# Patient Record
Sex: MALE | Race: WHITE
[De-identification: names, ages, dates, MRNs, and addresses within clinical notes are randomized per-mention and may not be internally consistent; named-entity substitution may affect disease eponyms.]

---

## 2020-08-31 ENCOUNTER — HOSPITAL ENCOUNTER (INPATIENT)
Dept: HOSPITAL 62 - ER | Age: 85
LOS: 2 days | Discharge: HOME | DRG: 149 | End: 2020-09-02
Attending: INTERNAL MEDICINE | Admitting: INTERNAL MEDICINE
Payer: MEDICARE

## 2020-08-31 DIAGNOSIS — F32.9: ICD-10-CM

## 2020-08-31 DIAGNOSIS — Z79.4: ICD-10-CM

## 2020-08-31 DIAGNOSIS — E11.9: ICD-10-CM

## 2020-08-31 DIAGNOSIS — I25.10: ICD-10-CM

## 2020-08-31 DIAGNOSIS — K21.9: ICD-10-CM

## 2020-08-31 DIAGNOSIS — J32.0: ICD-10-CM

## 2020-08-31 DIAGNOSIS — R29.6: ICD-10-CM

## 2020-08-31 DIAGNOSIS — Z79.51: ICD-10-CM

## 2020-08-31 DIAGNOSIS — Z87.891: ICD-10-CM

## 2020-08-31 DIAGNOSIS — Z88.0: ICD-10-CM

## 2020-08-31 DIAGNOSIS — Z79.52: ICD-10-CM

## 2020-08-31 DIAGNOSIS — R22.1: ICD-10-CM

## 2020-08-31 DIAGNOSIS — Z79.890: ICD-10-CM

## 2020-08-31 DIAGNOSIS — Z86.73: ICD-10-CM

## 2020-08-31 DIAGNOSIS — I25.2: ICD-10-CM

## 2020-08-31 DIAGNOSIS — H81.399: Primary | ICD-10-CM

## 2020-08-31 DIAGNOSIS — Z79.82: ICD-10-CM

## 2020-08-31 DIAGNOSIS — E78.5: ICD-10-CM

## 2020-08-31 DIAGNOSIS — H81.8X9: ICD-10-CM

## 2020-08-31 DIAGNOSIS — R41.0: ICD-10-CM

## 2020-08-31 DIAGNOSIS — J44.9: ICD-10-CM

## 2020-08-31 DIAGNOSIS — R41.81: ICD-10-CM

## 2020-08-31 DIAGNOSIS — Z91.81: ICD-10-CM

## 2020-08-31 DIAGNOSIS — R27.0: ICD-10-CM

## 2020-08-31 DIAGNOSIS — E03.9: ICD-10-CM

## 2020-08-31 LAB
ADD MANUAL DIFF: NO
ALBUMIN SERPL-MCNC: 3.9 G/DL (ref 3.5–5)
ALP SERPL-CCNC: 140 U/L (ref 38–126)
ANION GAP SERPL CALC-SCNC: 7 MMOL/L (ref 5–19)
APPEARANCE UR: CLEAR
APTT PPP: YELLOW S
AST SERPL-CCNC: 46 U/L (ref 17–59)
BASOPHILS # BLD AUTO: 0 10^3/UL (ref 0–0.2)
BASOPHILS NFR BLD AUTO: 0.5 % (ref 0–2)
BILIRUB DIRECT SERPL-MCNC: 0.3 MG/DL (ref 0–0.4)
BILIRUB SERPL-MCNC: 0.8 MG/DL (ref 0.2–1.3)
BILIRUB UR QL STRIP: NEGATIVE
BUN SERPL-MCNC: 15 MG/DL (ref 7–20)
CALCIUM: 9.2 MG/DL (ref 8.4–10.2)
CHLORIDE SERPL-SCNC: 101 MMOL/L (ref 98–107)
CK MB SERPL-MCNC: 2.24 NG/ML (ref ?–4.55)
CK SERPL-CCNC: 196 U/L (ref 55–170)
CO2 SERPL-SCNC: 30 MMOL/L (ref 22–30)
EOSINOPHIL # BLD AUTO: 0.2 10^3/UL (ref 0–0.6)
EOSINOPHIL NFR BLD AUTO: 2.6 % (ref 0–6)
ERYTHROCYTE [DISTWIDTH] IN BLOOD BY AUTOMATED COUNT: 14.5 % (ref 11.5–14)
GLUCOSE SERPL-MCNC: 146 MG/DL (ref 75–110)
GLUCOSE UR STRIP-MCNC: NEGATIVE MG/DL
HCT VFR BLD CALC: 38.7 % (ref 37.9–51)
HGB BLD-MCNC: 13 G/DL (ref 13.5–17)
KETONES UR STRIP-MCNC: NEGATIVE MG/DL
LYMPHOCYTES # BLD AUTO: 0.8 10^3/UL (ref 0.5–4.7)
LYMPHOCYTES NFR BLD AUTO: 12.3 % (ref 13–45)
MCH RBC QN AUTO: 29.5 PG (ref 27–33.4)
MCHC RBC AUTO-ENTMCNC: 33.7 G/DL (ref 32–36)
MCV RBC AUTO: 88 FL (ref 80–97)
MONOCYTES # BLD AUTO: 0.5 10^3/UL (ref 0.1–1.4)
MONOCYTES NFR BLD AUTO: 7.9 % (ref 3–13)
NEUTROPHILS # BLD AUTO: 4.7 10^3/UL (ref 1.7–8.2)
NEUTS SEG NFR BLD AUTO: 76.7 % (ref 42–78)
NITRITE UR QL STRIP: NEGATIVE
PH UR STRIP: 6 [PH] (ref 5–9)
PLATELET # BLD: 245 10^3/UL (ref 150–450)
POTASSIUM SERPL-SCNC: 4.2 MMOL/L (ref 3.6–5)
PROT SERPL-MCNC: 6.9 G/DL (ref 6.3–8.2)
PROT UR STRIP-MCNC: 30 MG/DL
RBC # BLD AUTO: 4.41 10^6/UL (ref 4.35–5.55)
SP GR UR STRIP: 1.02
TOTAL CELLS COUNTED % (AUTO): 100 %
TROPONIN I SERPL-MCNC: < 0.012 NG/ML
UROBILINOGEN UR-MCNC: 2 MG/DL (ref ?–2)
WBC # BLD AUTO: 6.2 10^3/UL (ref 4–10.5)

## 2020-08-31 PROCEDURE — 83036 HEMOGLOBIN GLYCOSYLATED A1C: CPT

## 2020-08-31 PROCEDURE — 36415 COLL VENOUS BLD VENIPUNCTURE: CPT

## 2020-08-31 PROCEDURE — 81001 URINALYSIS AUTO W/SCOPE: CPT

## 2020-08-31 PROCEDURE — 70450 CT HEAD/BRAIN W/O DYE: CPT

## 2020-08-31 PROCEDURE — 80053 COMPREHEN METABOLIC PANEL: CPT

## 2020-08-31 PROCEDURE — 85652 RBC SED RATE AUTOMATED: CPT

## 2020-08-31 PROCEDURE — 85025 COMPLETE CBC W/AUTO DIFF WBC: CPT

## 2020-08-31 PROCEDURE — 84443 ASSAY THYROID STIM HORMONE: CPT

## 2020-08-31 PROCEDURE — 93010 ELECTROCARDIOGRAM REPORT: CPT

## 2020-08-31 PROCEDURE — 84484 ASSAY OF TROPONIN QUANT: CPT

## 2020-08-31 PROCEDURE — 82553 CREATINE MB FRACTION: CPT

## 2020-08-31 PROCEDURE — 82550 ASSAY OF CK (CPK): CPT

## 2020-08-31 PROCEDURE — 86140 C-REACTIVE PROTEIN: CPT

## 2020-08-31 PROCEDURE — 83735 ASSAY OF MAGNESIUM: CPT

## 2020-08-31 PROCEDURE — 99285 EMERGENCY DEPT VISIT HI MDM: CPT

## 2020-08-31 PROCEDURE — 71250 CT THORAX DX C-: CPT

## 2020-08-31 PROCEDURE — 84100 ASSAY OF PHOSPHORUS: CPT

## 2020-08-31 PROCEDURE — 93005 ELECTROCARDIOGRAM TRACING: CPT

## 2020-08-31 PROCEDURE — G0378 HOSPITAL OBSERVATION PER HR: HCPCS

## 2020-08-31 PROCEDURE — 93880 EXTRACRANIAL BILAT STUDY: CPT

## 2020-08-31 PROCEDURE — 82962 GLUCOSE BLOOD TEST: CPT

## 2020-08-31 PROCEDURE — A9576 INJ PROHANCE MULTIPACK: HCPCS

## 2020-08-31 PROCEDURE — 70553 MRI BRAIN STEM W/O & W/DYE: CPT

## 2020-08-31 PROCEDURE — 70543 MRI ORBT/FAC/NCK W/O &W/DYE: CPT

## 2020-08-31 PROCEDURE — 80048 BASIC METABOLIC PNL TOTAL CA: CPT

## 2020-08-31 RX ADMIN — INSULIN LISPRO SCH: 100 INJECTION, SOLUTION INTRAVENOUS; SUBCUTANEOUS at 23:17

## 2020-08-31 RX ADMIN — ATORVASTATIN CALCIUM SCH MG: 80 TABLET, FILM COATED ORAL at 23:24

## 2020-08-31 RX ADMIN — LEVOTHYROXINE SODIUM SCH MG: 75 TABLET ORAL at 23:24

## 2020-08-31 RX ADMIN — FAMOTIDINE SCH MG: 20 TABLET, FILM COATED ORAL at 23:25

## 2020-08-31 RX ADMIN — FLUTICASONE PROPIONATE SCH SPR: 50 SPRAY, METERED NASAL at 23:26

## 2020-08-31 NOTE — ER DOCUMENT REPORT
ED Dizziness/Weakness





- General


Chief Complaint: Dizziness


Stated Complaint: NAUSEA/DIZZINESS


Time Seen by Provider: 08/31/20 11:02


Primary Care Provider: 


DARLING WHEELER III, MD [Primary Care Provider] - Follow up as needed


Information source: Patient, Relative - spouse





- [facility]


Notes: 





Patient complains of approximate 3 days of severe dizziness.  He states his been

unable to ambulate and has had several falls at home.  No significant trauma.  

He states the room is spinning.  He states even sitting up compared to lying 

down causes severe dizziness.  His symptoms have been 5-6 times per day.  They 

are worse with movement and better with rest.  They are severe.  He states he ha

s not had this any other time except for about 30 years ago but does not know 

the diagnosis at that time.  No recent falls or trauma.  No fevers.  He has had 

some nausea and vomiting with these episodes as well.





- Related Data


Allergies/Adverse Reactions: 


                                        





No Known Allergies Allergy (Unverified 09/12/14 19:34)


   











Past Medical History





- General


Information source: Patient





- Social History


Smoking Status: Former Smoker


Frequency of alcohol use: None


Drug Abuse: None


Family History: Reviewed & Not Pertinent





- Past Medical History


Cardiac Medical History: Reports: Hx Hypercholesterolemia


GI Medical History: Reports: Hx Gastroesophageal Reflux Disease


Psychiatric Medical History: Reports: Hx Depression


Past Surgical History: Reports: Hx Appendectomy, Hx Orthopedic Surgery - 

shoulder, Hx Tonsillectomy





- Immunizations


Hx Diphtheria, Pertussis, Tetanus Vaccination: Yes


Hx Pneumococcal Vaccination: 01/01/12





Review of Systems





- Review of Systems


Constitutional: denies: Chills, Fever


Cardiovascular: denies: Chest pain, Palpitations


Respiratory: denies: Cough, Short of breath


-: Yes All other systems reviewed and negative





Physical Exam





- Vital signs


Vitals: 





                                        











Temp Pulse Resp BP Pulse Ox


 


 98.1 F   71   18   146/79 H  95 


 


 08/31/20 09:08  08/31/20 09:08  08/31/20 09:08  08/31/20 09:08  08/31/20 09:08











Interpretation: Hypertensive





- General


General appearance: Appears well, Alert





- HEENT


Head: Normocephalic, Atraumatic


Eyes: Normal


Pupils: PERRL





- Respiratory


Respiratory status: No respiratory distress


Chest status: Nontender


Breath sounds: Normal


Chest palpation: Normal





- Cardiovascular


Rhythm: Regular


Heart sounds: Normal auscultation


Murmur: No





- Abdominal


Inspection: Normal


Distension: No distension


Bowel sounds: Normal


Tenderness: Nontender


Organomegaly: No organomegaly





- Back


Back: Normal, Nontender





- Extremities


General upper extremity: Normal inspection, Nontender, Normal color, Normal ROM,

Normal temperature


General lower extremity: Normal inspection, Nontender, Normal color, Normal ROM,

Normal temperature, Normal weight bearing.  No: Patrice's sign





- Neurological


Neuro grossly intact: Yes


Cognition: Normal


Orientation: AAOx4


Hemant Coma Scale Eye Opening: Spontaneous


[place] Coma Scale Verbal: Oriented


[place] Coma Scale Motor: Obeys Commands


Hemant Coma Scale Total: 15


Speech: Normal


Cranial nerves: Normal


Cerebellar coordination: No: Finger-nose rhombey


Motor strength normal: LUE, RUE, LLE, RLE


Additional motor exam normals: Equal .  No: Pronator drift


Sensory: Normal


Notes: 





Patient has nystagmus with a fast component to the left.  I only noticed the 

nystagmus when patient looks to the left or looks up.





- Psychological


Associated symptoms: Normal affect, Normal mood





- Skin


Skin Temperature: Warm


Skin Moisture: Dry


Skin Color: Normal





Course





- Re-evaluation


Re-evalutation: 





08/31/20 17:34


Patient presents with severe vertigo.  He has nystagmus on exam.  Patient cannot

even sit up on the bed to tolerate orthostatics nor could he ambulate.  Work-up 

is unremarkable for any central cause of vertigo however patient has obvious 

peripheral vertigo.  He is got some minor sinusitis on imaging.  Seems most 

prudent at this time the patient at the admitted for observation since he is 

unable to ambulate.  He will be treated with antibiotics plus or minus some 

steroids.





- Vital Signs


Vital signs: 





                                        











Temp Pulse Resp BP Pulse Ox


 


 98.1 F   71   15   127/72 H  95 


 


 08/31/20 09:08  08/31/20 09:08  08/31/20 14:00  08/31/20 11:01  08/31/20 14:00














- Laboratory


Result Diagrams: 


                                 08/31/20 09:32





                                 08/31/20 09:32


Laboratory results interpreted by me: 





                                        











  08/31/20 08/31/20 08/31/20





  09:32 09:32 10:38


 


Hgb  13.0 L  


 


RDW  14.5 H  


 


Lymph % (Auto)  12.3 L  


 


Glucose   146 H 


 


ALT   54 H 


 


Alkaline Phosphatase   140 H 


 


Creatine Kinase   196 H 


 


Urine Protein    30 H


 


Urine Urobilinogen    2.0 H


 


Urine Ascorbic Acid    40 H














- Diagnostic Test


Radiology reviewed: Image reviewed, Reports reviewed





- EKG Interpretation by Me


EKG shows normal: Sinus rhythm


Rate: Normal - 71


Rhythm: NSR


Axis/QRS: No: Right axis deviation, Left axis deviation





Discharge





- Discharge


Clinical Impression: 


Peripheral vertigo


Qualifiers:


 Laterality: unspecified laterality Qualified Code(s): H81.399 - Other 

peripheral vertigo, unspecified ear





Sinusitis


Qualifiers:


 Sinusitis location: maxillary Chronicity: unspecified Qualified Code(s): J32.0 

- Chronic maxillary sinusitis





Condition: Stable


Disposition: ADMITTED AS OBSERVATION


Admitting Provider: Caren (Hospitalist)


Unit Admitted: Medical Floor


Referrals: 


DARLING WHEELER III, MD [Primary Care Provider] - Follow up as needed

## 2020-08-31 NOTE — RADIOLOGY REPORT (SQ)
EXAM DESCRIPTION:  CT HEAD WITHOUT



IMAGES COMPLETED DATE/TIME:  8/31/2020 11:30 am



REASON FOR STUDY:  dizzy



COMPARISON:  5/13/2014



TECHNIQUE:  Axial images acquired through the brain without intravenous contrast.  Images reviewed wi
th bone, brain and subdural windows.  Additional sagittal and coronal reconstructions were generated.
 Images stored on PACS.

All CT scanners at this facility use dose modulation, iterative reconstruction, and/or weight based d
osing when appropriate to reduce radiation dose to as low as reasonably achievable (ALARA).

[facility]: Dose Right  [facility]: CareDose    [facility]: Dose Right    [facility]: Teradose 4D    [facility]: Smart Technologies



RADIATION DOSE:  CT Rad equipment meets quality standard of care and radiation dose reduction techniq
ues were employed. CTDIvol: 53.2 mGy. DLP: 1150 mGy-cm.mGy.



LIMITATIONS:  None.



FINDINGS:  VENTRICLES: Prominent.

CEREBRUM: No masses.  No hemorrhage.  No midline shift.  Areas of low density in the white matter mos
t likely due to chronic micro-vascular ischemic change.  No evidence for acute infarction.

CEREBELLUM: No masses.  No hemorrhage.  No alteration of density.  No evidence for acute infarction.

EXTRAAXIAL SPACES: Age-related involutional change.  No fluid collections.  No masses.

ORBITS AND GLOBE: No intra- or extraconal masses.  Normal contour of globe without masses.

CALVARIUM: No fracture.

PARANASAL SINUSES: Mild mucosal thickening within the ethmoid air cells and inferior bilateral maxill
digna sinuses.  Remaining sinuses are clear.

SOFT TISSUES: No mass or hematoma.

OTHER: No other significant finding.



IMPRESSION:  CHRONIC CHANGES OF ATROPHY AND MICROVASCULAR ISCHEMIA.  NO ACUTE PROCESS.

MILD MUCOSAL THICKENING WITHIN THE ETHMOID AIR CELLS AND MAXILLARY SINUSES.

EVIDENCE OF ACUTE STROKE: NO.



TECHNICAL DOCUMENTATION:  JOB ID:  [id number]

Quality ID # 436: Final reports with documentation of one or more dose reduction techniques (e.g., Au
tomated exposure control, adjustment of the mA and/or kV according to patient size, use of iterative 
reconstruction technique)

 2011 [employer]- All Rights Reserved



Reading location - IP/workstation name: [email or web address]

## 2020-08-31 NOTE — RADIOLOGY REPORT (SQ)
EXAM DESCRIPTION:  MRI HEAD COMBO



IMAGES COMPLETED DATE/TIME:  8/31/2020 2:29 pm



REASON FOR STUDY:  vertigo for 3 days.  Dizziness, headache, nausea, off balance, vomiting, history o
f CVA.



COMPARISON:  CT head same date.  MRI brain, 9/13/2014.



TECHNIQUE:  Multiplanar imaging includes noncontrasted T1, T2, FLAIR, diffusion with ADC map and post
gadolinium contrast T1 sequences. Images stored on PACS.



CONTRAST TYPE AND DOSE:  20 mL ProHance



RENAL FUNCTION:  Not indicated.  ACR Type II contrast agent associated with few, if any, unconfounded
 cases of NSF



LIMITATIONS:  None.



FINDINGS:  ANATOMY: No anomalies. Normal vascular flow voids. Pituitary fossa normal.

CSF SPACES: Normal in size and contour. No hemorrhage.

CEREBRUM: Sulci and gyri normal in size and contour.  Minimal patchy hyperintense white matter signal
 on FLAIR imaging. No evidence of hemorrhage, mass, or extraaxial fluid collection. No abnormal enhan
cement post contrast.

POSTERIOR FOSSA: No signal alteration. No hemorrhage. No edema, masses, or mass effect. Internal artur
tory canals, cerebellopontine angles, mastoids normal. No enhancing lesions. No abnormal enhancement 
post contrast.

DIFFUSION IMAGING: Negative for acute or subacute infarction.

ORBITS: No masses. Globes normal.

PARANASAL SINUSES: Mild mucosal thickening maxillary, sphenoid and ethmoid sinuses.

OTHER: No other significant finding.



IMPRESSION:

1. No acute ischemia, intracranial mass or mass effect, or evidence of intracranial hemorrhage.

2. Mild chronic sinusitis.

EVIDENCE OF ACUTE STROKE: NO.



TECHNICAL DOCUMENTATION:  JOB ID:  [id number]

 2011 [employer]- All Rights Reserved



Reading location - IP/workstation name: [email or web address]

## 2020-09-01 LAB
ADD MANUAL DIFF: NO
ANION GAP SERPL CALC-SCNC: 9 MMOL/L (ref 5–19)
BASOPHILS # BLD AUTO: 0 10^3/UL (ref 0–0.2)
BASOPHILS NFR BLD AUTO: 0.1 % (ref 0–2)
BUN SERPL-MCNC: 16 MG/DL (ref 7–20)
CALCIUM: 9.7 MG/DL (ref 8.4–10.2)
CHLORIDE SERPL-SCNC: 102 MMOL/L (ref 98–107)
CO2 SERPL-SCNC: 26 MMOL/L (ref 22–30)
CRP SERPL-MCNC: < 5 MG/L (ref ?–10)
EOSINOPHIL # BLD AUTO: 0 10^3/UL (ref 0–0.6)
EOSINOPHIL NFR BLD AUTO: 0.1 % (ref 0–6)
ERYTHROCYTE [DISTWIDTH] IN BLOOD BY AUTOMATED COUNT: 14.4 % (ref 11.5–14)
ERYTHROCYTE [SEDIMENTATION RATE] IN BLOOD: 13 MM/HR (ref 0–20)
GLUCOSE SERPL-MCNC: 171 MG/DL (ref 75–110)
HCT VFR BLD CALC: 40.5 % (ref 37.9–51)
HGB BLD-MCNC: 13.9 G/DL (ref 13.5–17)
LYMPHOCYTES # BLD AUTO: 0.5 10^3/UL (ref 0.5–4.7)
LYMPHOCYTES NFR BLD AUTO: 8.1 % (ref 13–45)
MCH RBC QN AUTO: 30.2 PG (ref 27–33.4)
MCHC RBC AUTO-ENTMCNC: 34.3 G/DL (ref 32–36)
MCV RBC AUTO: 88 FL (ref 80–97)
MONOCYTES # BLD AUTO: 0.1 10^3/UL (ref 0.1–1.4)
MONOCYTES NFR BLD AUTO: 1.3 % (ref 3–13)
NEUTROPHILS # BLD AUTO: 6.1 10^3/UL (ref 1.7–8.2)
NEUTS SEG NFR BLD AUTO: 90.4 % (ref 42–78)
PHOSPHATE SERPL-MCNC: 3.5 MG/DL (ref 2.5–4.5)
PLATELET # BLD: 257 10^3/UL (ref 150–450)
POTASSIUM SERPL-SCNC: 4.5 MMOL/L (ref 3.6–5)
RBC # BLD AUTO: 4.6 10^6/UL (ref 4.35–5.55)
TOTAL CELLS COUNTED % (AUTO): 100 %
WBC # BLD AUTO: 6.7 10^3/UL (ref 4–10.5)

## 2020-09-01 RX ADMIN — INSULIN LISPRO SCH UNIT: 100 INJECTION, SOLUTION INTRAVENOUS; SUBCUTANEOUS at 07:42

## 2020-09-01 RX ADMIN — PANTOPRAZOLE SODIUM SCH MG: 40 TABLET, DELAYED RELEASE ORAL at 10:50

## 2020-09-01 RX ADMIN — ASPIRIN SCH MG: 81 TABLET, CHEWABLE ORAL at 10:50

## 2020-09-01 RX ADMIN — ENOXAPARIN SODIUM SCH MG: 40 INJECTION SUBCUTANEOUS at 10:51

## 2020-09-01 RX ADMIN — INSULIN LISPRO SCH UNIT: 100 INJECTION, SOLUTION INTRAVENOUS; SUBCUTANEOUS at 17:36

## 2020-09-01 RX ADMIN — LEVOTHYROXINE SODIUM SCH MG: 75 TABLET ORAL at 12:34

## 2020-09-01 RX ADMIN — CITALOPRAM HYDROBROMIDE SCH MG: 20 TABLET ORAL at 10:50

## 2020-09-01 RX ADMIN — ATORVASTATIN CALCIUM SCH MG: 80 TABLET, FILM COATED ORAL at 21:19

## 2020-09-01 RX ADMIN — INSULIN LISPRO SCH UNIT: 100 INJECTION, SOLUTION INTRAVENOUS; SUBCUTANEOUS at 12:03

## 2020-09-01 RX ADMIN — FAMOTIDINE SCH MG: 20 TABLET, FILM COATED ORAL at 21:19

## 2020-09-01 RX ADMIN — FLUTICASONE PROPIONATE SCH SPR: 50 SPRAY, METERED NASAL at 10:50

## 2020-09-01 RX ADMIN — BUSPIRONE HYDROCHLORIDE SCH MG: 10 TABLET ORAL at 10:50

## 2020-09-01 RX ADMIN — FLUTICASONE FUROATE AND VILANTEROL TRIFENATATE SCH INHALER: 100; 25 POWDER RESPIRATORY (INHALATION) at 10:50

## 2020-09-01 RX ADMIN — DOCUSATE SODIUM SCH MG: 100 CAPSULE, LIQUID FILLED ORAL at 10:49

## 2020-09-01 RX ADMIN — INSULIN LISPRO SCH UNIT: 100 INJECTION, SOLUTION INTRAVENOUS; SUBCUTANEOUS at 22:50

## 2020-09-01 RX ADMIN — PREDNISONE SCH MG: 20 TABLET ORAL at 10:50

## 2020-09-01 NOTE — RADIOLOGY REPORT (SQ)
CT ANGIOGRAM CHEST WITH IV CONTRAST: 9/1/2020 1:40 AM CDT



HISTORY: 85-year old patient with generalized weakness, smoking

history.



TECHNIQUE:  Postcontrast CT through the chest was performed per

protocol for CT angiography.  3D Multiplanar reformations were

performed at the workstation. Reconstructed sagittal and coronal

images were also obtained through the chest. This exam was

performed according to our departmental dose-optimization

program, which includes automated exposure control, adjustment of

the mA and/or KV according to the patient's size and/or use of

iterative reconstruction technique.



COMPARISON: None available



FINDINGS: The heart size is within normal limits of size. No

large pericardial effusion is seen. Coronary artery

calcifications are seen.



No significant mediastinal, supraclavicular, or axillary

lymphadenopathy is seen. 



The thoracic aorta is within normal limits of size. 



No filling defects are seen within the pulmonary arteries to

suggest a pulmonary artery embolism. The main pulmonary artery is

within normal limits in size.



The thyroid gland is unremarkable. 



The central tracheobronchial tree is patent. 



There are airspace opacities seen within the subpleural region of

the right lower lobe. There are moderate to severe centrilobular

and paraseptal emphysematous changes present.



No discrete pleural effusion is seen.



There is no evidence of a pneumothorax.



The bones demonstrate no suspicious lytic or blastic lesion.

Multilevel degenerative changes are seen within the thoracic

spine.



The visualized portions of the upper abdomen appear grossly

unremarkable. Cholelithiasis is seen. There is elevation of the

right hemidiaphragm.



IMPRESSION: 

There is a subpleural airspace opacity at the right lower lobe.

This could reflect atelectasis. Interval follow-up is recommended

to exclude a developing nodule.



No filling defect is seen to suggest a pulmonary artery embolism.



Moderate paraseptal and centrilobular emphysematous changes are

seen.

## 2020-09-01 NOTE — ADVANCED CARE
- Diagnosis


(1) Peripheral vertigo


Diagnosis Current: Yes   





(2) Sinusitis


Diagnosis Current: Yes   





(3) COPD (chronic obstructive pulmonary disease)


Diagnosis Current: Yes   





(4) Former smoker


Diagnosis Current: Yes   





(5) Hyperlipidemia


Diagnosis Current: Yes   





(6) Hypothyroidism


Diagnosis Current: Yes   





(7) Neck mass


Diagnosis Current: Yes   


Attendance: 





Patient and wife


Resuscitation Status: Full Code


Discussion: 





All aspects of code status discussed with patient/POA including cardioversion, 

chest compressions, and intubation and the patient/POA indicated they wish to be

full code





MPOA is designated as: Wife Maria Eugenia


Time Spent: Greater than 16 minutes

## 2020-09-01 NOTE — PDOC PROGRESS REPORT
Subjective


Subjective:: 





Patient met yesterday for severe dizziness worse with movement, suspect BPPV.  

MRI brain did not show acute abnormalities nor did CT head.  Echocardiogram was 

ordered as well as carotid PVL.  Carotid PVL did not show any focal stenoses in 

his carotid arteries however did show possible mass at the right carotid 

bifurcation/bulb.  MRI of the face/neck is been ordered as recommended by the 

radiologist.  Patient's A1c was 7.2 which is acceptable for his age group.  

Patient still extremely dizzy though this is notably improved from yesterday 

according to the patient.  Physical therapy and outpatient therapy evaluate the 

patient and stated he has very limited ability to participate in therapy due to 

severe dizziness.  His wife might be open to him going to a rehab facility 

however the patient has told her he is adamant that he will not go there.  He 

had a brief period of confusion according to the wife however this promptly 

resolved.  Likely this occurred due to mild cognitive impairment reported by his

wife superimposed on steroids and mild hospital delirium.  He was at baseline 

mentation when I saw him today.  Reportedly, his son is an occupational 

therapist and his daughter-in-law is a nurse and they both volunteered to come 

and take care of the patient starting on Thursday.  Patient has no new complain

ts otherwise.


Reason For Visit: 


BPPV,DIZZINESS,VOMITING








Physical Exam


Vital Signs: 


                                        











Temp Pulse Resp BP Pulse Ox


 


 97.4 F   79   18   119/65   95 


 


 09/01/20 11:35  09/01/20 14:02  09/01/20 14:02  09/01/20 11:35  09/01/20 14:02








                                 Intake & Output











 08/31/20 09/01/20 09/02/20





 06:59 06:59 06:59


 


Intake Total  260 


 


Output Total  1000 


 


Balance  -740 


 


Weight  97.4 kg 











Exam: 





General appearance: PRESENT: no acute distress, obese, well-developed, well-

nourished, states his dizziness is significantly improved today


Head exam: PRESENT: atraumatic, normocephalic


Eye exam: PRESENT: conjunctiva pink, EOMI, nystagmus.  ABSENT: scleral icterus


Mouth exam: PRESENT: moist


Respiratory exam: PRESENT: clear to auscultation aki.  ABSENT: rales, rhonchi, 

wheezes


Cardiovascular exam: PRESENT: RRR, systolic murmur - Systolic ejection murmur 

consistent with aortic stenosis.  ABSENT: diastolic murmur, rubs


GI/Abdominal exam: PRESENT: normal bowel sounds, soft.  ABSENT: distended, 

guarding, mass, organolmegaly, rebound, tenderness


Extremities exam: ABSENT: pedal edema


Neurological exam: PRESENT: alert, awake, oriented to person, oriented to place,

oriented to time, oriented to situation, CN II-XII grossly intact.  ABSENT: 

motor sensory deficit - Strength is 5/5 in upper and lower extremities equal 

bilaterally, reflexes 2/4 upper and lower extremities equal bilaterally


Psychiatric exam: PRESENT: appropriate affect, normal mood


Skin exam: PRESENT: dry, intact, warm





Results


Laboratory Results: 


                                        





                                 09/01/20 05:18 





                                 09/01/20 05:18 





                                        











  09/01/20 09/01/20 09/01/20





  05:18 05:18 05:18


 


WBC  6.7  


 


RBC  4.60  


 


Hgb  13.9  


 


Hct  40.5  


 


MCV  88  


 


MCH  30.2  


 


MCHC  34.3  


 


RDW  14.4 H  


 


Plt Count  257  


 


Seg Neutrophils %  90.4 H  


 


Sodium   137.1 


 


Potassium   4.5 


 


Chloride   102 


 


Carbon Dioxide   26 


 


Anion Gap   9 


 


BUN   16 


 


Creatinine   0.78 


 


Est GFR ( Amer)   > 60 


 


Glucose   171 H 


 


Calcium   9.7 


 


Phosphorus   3.5 


 


Magnesium   2.1 


 


C-Reactive Protein   < 5.0 


 


TSH    0.32 L








                                        











  08/31/20 08/31/20





  09:32 09:32


 


Creatine Kinase  196 H 


 


CK-MB (CK-2)   2.24


 


Troponin I   < 0.012











Impressions: 


                                        





Chest CT  08/31/20 00:00


IMPRESSION: 


There is a subpleural airspace opacity at the right lower lobe.


This could reflect atelectasis. Interval follow-up is recommended


to exclude a developing nodule.


 


No filling defect is seen to suggest a pulmonary artery embolism.


 


Moderate paraseptal and centrilobular emphysematous changes are


seen.


 


 








Head CT  08/31/20 11:13


IMPRESSION:  CHRONIC CHANGES OF ATROPHY AND MICROVASCULAR ISCHEMIA.  NO ACUTE 

PROCESS.


MILD MUCOSAL THICKENING WITHIN THE ETHMOID AIR CELLS AND MAXILLARY SINUSES.


EVIDENCE OF ACUTE STROKE: NO.


 








Head MRI  08/31/20 13:31


IMPRESSION:


1. No acute ischemia, intracranial mass or mass effect, or evidence of 

intracranial hemorrhage.


2. Mild chronic sinusitis.


EVIDENCE OF ACUTE STROKE: NO.


 








Carotid Doppler Study  08/31/20 18:47


IMPRESSION:  NO HEMODYNAMICALLY SIGNIFICANT STENOSIS.


SMALL MASS LOCATED BETWEEN THE RIGHT INTERNAL AND EXTERNAL CAROTID ARTERIES.  

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  RECOMMEND FOLLOW-UP WITH MRI OF THE 

NECK.


 














Assessment and Plan





- Diagnosis


(1) Peripheral vertigo


Qualifiers: 


   Laterality: unspecified laterality   Qualified Code(s): H81.399 - Other 

peripheral vertigo, unspecified ear   


Is this a current diagnosis for this admission?: Yes   


Plan: 





Likely caused by sinusitis and otoliths causing BPPV


Needs ENT follow-up outpatient


Prednisone taper


Meclizine as needed


PT/OT evaluation


Carotid PVL


MRI with and without contrast brain no acute findings, possibly prominent 

ventricles per my read however patient denies any loss of bowel or bladder 

control and is not acutely confused





9/1/2020


Significant improvement on steroids, continue taper


Working with PT/OT, may benefit from SNF although patient reportedly told his 

wife he would not like to go to a rehab facility








(2) Sinusitis


Qualifiers: 


   Sinusitis location: maxillary   Chronicity: unspecified   Qualified Code(s): 

J32.0 - Chronic maxillary sinusitis   


Is this a current diagnosis for this admission?: Yes   


Plan: 





Seen on MRI brain


Fluticasone nasal spray


Oral steroids as above


Continue OTC allergy med





Possibly the source of his dizziness, improving








(3) COPD (chronic obstructive pulmonary disease)


Is this a current diagnosis for this admission?: Yes   





(4) Former smoker


Is this a current diagnosis for this admission?: Yes   





(5) Hyperlipidemia


Is this a current diagnosis for this admission?: Yes   





(6) Hypothyroidism


Is this a current diagnosis for this admission?: Yes   





(7) Neck mass


Is this a current diagnosis for this admission?: Yes   


Plan: 





Seen on carotid PVL at right carotid bulb/bifurcation


MRI face/neck with contrast ordered per radiology recommendations








- Time


Time Spent with patient: 25-34 minutes


Medications reviewed and adjusted accordingly: Yes


Anticipated Discharge Disposition: Home with Home Health


Anticipated Discharge Timeframe: within 48 hours





- Inpatient Certification


Based on my medical assessment, after consideration of the patient's 

comorbidities, presenting symptoms, or acuity I expect that the services needed 

warrant INPATIENT care.: Yes


I certify that my determination is in accordance with my understanding of 

Medicare's requirements for reasonable and necessary INPATIENT services [42 CFR 

412.3e].: Yes


Medical Necessity: Significant Comorbidiites Make Outpatient Treatment Too 

Risky, Need Close Monitoring Due to Risk of Patient Decompensation, Risk of 

Complication if Not Cared For in Hospital, Risk of Diagnosis Which Will Require 

Inpatient Eval/Care/Monitoring

## 2020-09-01 NOTE — RADIOLOGY REPORT (SQ)
EXAM DESCRIPTION:  CAROTID DOPPLER



IMAGES COMPLETED DATE/TIME:  8/31/2020 9:52 pm



REASON FOR STUDY:  vascular disease



COMPARISON:  9/13/2014.



TECHNIQUE:  Grayscale ultrasound, Doppler velocity and spectra, and color Doppler images acquired of 
the extra-cranial carotid and vertebral arteries. Images stored on PACS.



LIMITATIONS:  None.



FINDINGS:  RIGHT CAROTID

CCA Velocities: Within normal limits.

ICA Velocities

 Peak systolic 0.78 m/s.

 End diastolic 0.21 m/s.

Proximal ICA/CCA peak systolic ratio 0.8.

Spectra normal. No significant plaque.

There is a 1.1 x 1.8 cm circumscribed mass located between the internal and external carotid arteries
.

LEFT CAROTID

CCA Velocities: Within normal limits.

ICA Velocities

 Peak systolic 0.92 m/s.

 End diastolic 0.26 m/s.

Proximal ICA/CCA peak systolic ratio 0.9.

Spectra normal. No significant plaque.

VERTEBRAL ARTERIES: Antegrade flow.  Normal waveforms.

SUBCLAVIAN ARTERIES: No finding.

OTHER: No other significant finding.



IMPRESSION:  NO HEMODYNAMICALLY SIGNIFICANT STENOSIS.

SMALL MASS LOCATED BETWEEN THE RIGHT INTERNAL AND EXTERNAL CAROTID ARTERIES.  THIS MAY REPRESENT A CA
ROTID BODY TUMOR.  RECOMMEND FOLLOW-UP WITH MRI OF THE NECK.



COMMENT:  Quality ID #195:  Velocity criteria are extrapolated from the diameter data as defined by t
he Society of Radiologists in Ultrasound Consensus Conference. Radiology 2003: 229; 340-346.



TECHNICAL DOCUMENTATION:  JOB ID:  [id number]

 2011 [employer]- All Rights Reserved



Reading location - IP/workstation name: [email or web address]

## 2020-09-02 VITALS — SYSTOLIC BLOOD PRESSURE: 139 MMHG | DIASTOLIC BLOOD PRESSURE: 70 MMHG

## 2020-09-02 RX ADMIN — ENOXAPARIN SODIUM SCH MG: 40 INJECTION SUBCUTANEOUS at 09:39

## 2020-09-02 RX ADMIN — BUSPIRONE HYDROCHLORIDE SCH MG: 10 TABLET ORAL at 09:37

## 2020-09-02 RX ADMIN — PREDNISONE SCH MG: 20 TABLET ORAL at 09:37

## 2020-09-02 RX ADMIN — FLUTICASONE PROPIONATE SCH SPR: 50 SPRAY, METERED NASAL at 09:39

## 2020-09-02 RX ADMIN — DOCUSATE SODIUM SCH MG: 100 CAPSULE, LIQUID FILLED ORAL at 09:37

## 2020-09-02 RX ADMIN — INSULIN LISPRO SCH UNIT: 100 INJECTION, SOLUTION INTRAVENOUS; SUBCUTANEOUS at 17:07

## 2020-09-02 RX ADMIN — INSULIN LISPRO SCH: 100 INJECTION, SOLUTION INTRAVENOUS; SUBCUTANEOUS at 08:40

## 2020-09-02 RX ADMIN — ASPIRIN SCH MG: 81 TABLET, CHEWABLE ORAL at 09:37

## 2020-09-02 RX ADMIN — CITALOPRAM HYDROBROMIDE SCH MG: 20 TABLET ORAL at 09:38

## 2020-09-02 RX ADMIN — INSULIN LISPRO SCH UNIT: 100 INJECTION, SOLUTION INTRAVENOUS; SUBCUTANEOUS at 12:04

## 2020-09-02 RX ADMIN — PANTOPRAZOLE SODIUM SCH MG: 40 TABLET, DELAYED RELEASE ORAL at 09:37

## 2020-09-02 RX ADMIN — FLUTICASONE FUROATE AND VILANTEROL TRIFENATATE SCH INHALER: 100; 25 POWDER RESPIRATORY (INHALATION) at 09:38

## 2020-09-02 NOTE — PDOC DISCHARGE SUMMARY
Impression





- Admit/DC Date/PCP


Admission Date/Primary Care Provider: 


  08/31/20 17:45





  DARLING WHEELER III, MD





Discharge Date: 09/02/20





- Discharge Diagnosis


(1) Peripheral vertigo


Is this a current diagnosis for this admission?: Yes   





(2) Sinusitis


Is this a current diagnosis for this admission?: Yes   





(3) COPD (chronic obstructive pulmonary disease)


Is this a current diagnosis for this admission?: Yes   





(4) Former smoker


Is this a current diagnosis for this admission?: Yes   





(5) Hyperlipidemia


Is this a current diagnosis for this admission?: Yes   





(6) Hypothyroidism


Is this a current diagnosis for this admission?: Yes   





- Additional Information


Resuscitation Status: Full Code


Discharge Diet: Diabetic


Discharge Activity: Activity As Tolerated, Balance Activity w/Rest


Referrals: 


DARLING WHEELER III, MD [Primary Care Provider] - Follow up as needed


Prescriptions: 


Meclizine HCl [Antivert 12.5 mg Tablet] 12.5 mg PO Q8HP PRN #12 tablet


 PRN Reason: 


Prednisone [Deltasone 10 mg Tablet] 10 mg PO ASDIR #7 tablet


Fluticasone Propionate [Flonase Nasal Spray 50 Mcg/Spray 16 gm] 1 spray NASL 

DAILY #1 spray.pump


Insulin Lispro [Insulin Lispro Kwikpen U-100] 1 unit SQ ASDIR PRN #1 insuln.pen


 PRN Reason: 


Famotidine [Pepcid 20 mg Tablet] 20 mg PO QHS #30 tablet


Home Medications: 








Aspirin [Aspirin 81 mg Chewable Tablet] 81 mg PO BID 09/12/14 


Atorvastatin Calcium 80 mg PO QHS 09/12/14 


Cetirizine HCl [Zyrtec 10 mg Chewable Tab] 10 mg PO DAILYP PRN 09/12/14 


Citalopram Hydrobromide [Celexa 20 mg Tablet] 40 mg PO DAILY 09/12/14 


Pantoprazole Sodium [Protonix] 40 mg PO DAILY 09/12/14 


Albuterol Sulfate [Proair HFA Inhalation Aerosol 8.5 gm MDI] 1 puff IH Q6HP PRN 

08/31/20 


Budesonide/Formoterol Fumarate [Symbicort HFA 80-4.5 mcg Inhaler 6.9 gm] 2 puff 

IH BID 08/31/20 


Buspirone HCl 7.5 tab PO DAILY 08/31/20 


Donepezil HCl [Aricept 5 mg Tablet] 5 mg PO QHS 08/31/20 


Famotidine [Pepcid 20 mg Tablet] 20 mg PO DAILY 08/31/20 


Levothyroxine Sodium [Synthroid] 175 mcg PO DAILY 08/31/20 


Meloxicam [Mobic] 15 mg PO DAILY 08/31/20 


Multivitamin 1 each PO DAILY 08/31/20 


Famotidine [Pepcid 20 mg Tablet] 20 mg PO QHS #30 tablet 09/02/20 


Fluticasone Propionate [Flonase Nasal Spray 50 Mcg/Spray 16 gm] 1 spray NASL 

DAILY #1 spray.pump 09/02/20 


Insulin Lispro [Insulin Lispro Kwikpen U-100] 1 unit SQ ASDIR PRN #1 insuln.pen 

09/02/20 


Meclizine HCl [Antivert 12.5 mg Tablet] 12.5 mg PO Q8HP PRN #12 tablet 09/02/20 


Prednisone [Deltasone 10 mg Tablet] 10 mg PO ASDIR #7 tablet 09/02/20 











History of Present Illiness


History of Present Illness: 


MARIO LIMON is a 85 year old male with past medical history significant 

for MI/CAD follows with Dr. Baca cardiology, COPD stable follows with Dr. Ruffin, history of TIA, T2DM diet-controlled, former smoker presents with a 1 

day history of progressive nausea/vomiting/dizziness/ataxia all of which is 

worse with movement and ambulation.  Denies any fever/chills/diarrhea/abdominal 

pain/chest pain.  Patient does have a identical twin that had temporal arteritis

requiring steroids.  Patient denies any vision loss or acute vision changes.  

Denies any headaches.  Wife does note that the patient has been getting 

progressively weaker in general and is having less energy.  She also notes a 

very gradual mild cognitive decline over time.  Patient takes donepezil for this

and he said he has started this approximately 2 months ago.  Patient has had a 

problem with BPPV in the past and this resolved spontaneously.  This was many 

years ago.  Patient does not follow with ENT.  MRI with and without contrast 

brain did not show any acute findings the ventricles did appear somewhat 

prominent to me.  CT head was unremarkable as well.  Labs and vitals reviewed 

with no acute findings.  On exam, patient is unable to ambulate becomes 

extremely dizzy and is high risk to fall.  Wife states she can not care for the 

patient at home is unable to lift him if he does fall.  Patient be admitted to 

observation for treatment of BPPV with prednisone, fluticasone for sinusitis 

seen on MRI, PT/OT evaluation for home health.  Of note, patient has an 

extensive smoking history of approximately 60 pack years but quit many years 

ago.  Given his progressive generalized weakness and the fact he has never been 

evaluated for lung cancer, we will get a noncontrast CT.








Hospital Course


Hospital Course: 


Patient moved for vertigo/dizziness/severe ataxia, admitted for work-up for 

possible posterior stroke versus BPPV.  MRI brain did not show any acute 

abnormalities.  Carotid Doppler showed what turned out to be an odd carotid 

bifurcation over a centimeter which had been initially read as a mass and turned

out to not be a mass as we followed this up with an MRI of the orbits and neck 

which was normal per discussion with the radiologist.  Patient was started on 

steroid taper with prednisone, as needed meclizine, fluticasone nasal spray for 

acute on chronic sinusitis and patient improved substantially, ataxia gradually 

resolving at discharge and patient and his wife agreeable that he should be 

discharged home at this point with PT/OT home health.  Wife stated she has all 

assistive devices needed at the home including a walker and wheelchair.  Per 

their request, I prescribed the patient sliding scale insulin to give him some 

additional coverage as he tapers off the prednisone.  Wife states she is 

comfortable injecting the patient with insulin as she had a brother with 

diabetes in the past.  I reviewed the patient's echocardiogram that was done in 

June and this was essentially normal with good EF.  Patient stable for discharge

with the understanding that he will follow-up with PCP and ENT within the next 

week or so.





Physical Exam


Vital Signs: 


                                        











Temp Pulse Resp BP Pulse Ox


 


 97.5 F   66   21 H  112/55 L  94 


 


 09/02/20 12:32  09/02/20 12:32  09/02/20 12:32  09/02/20 12:32  09/02/20 12:32








                                 Intake & Output











 09/01/20 09/02/20 09/03/20





 06:59 06:59 06:59


 


Intake Total 260 920 340


 


Output Total 1000 1050 450


 


Balance -740 -130 -110


 


Weight 97.4 kg 97.4 kg 98.3 kg











Exam: 





General appearance: PRESENT: no acute distress, obese, well-developed, well-

nourished, states his dizziness is much less today and he would like to go home


Head exam: PRESENT: atraumatic, normocephalic


Eye exam: PRESENT: conjunctiva pink, EOMI, nystagmus.  ABSENT: scleral icterus


Mouth exam: PRESENT: moist


Respiratory exam: PRESENT: clear to auscultation aki.  ABSENT: rales, rhonchi, 

wheezes


Cardiovascular exam: PRESENT: RRR, systolic murmur - Systolic ejection murmur c

onsistent with aortic stenosis.  ABSENT: diastolic murmur, rubs


GI/Abdominal exam: PRESENT: normal bowel sounds, soft.  ABSENT: distended, 

guarding, mass, organolmegaly, rebound, tenderness


Extremities exam: ABSENT: pedal edema


Neurological exam: PRESENT: alert, awake, oriented to person, oriented to place,

oriented to time, oriented to situation, CN II-XII grossly intact.  ABSENT: 

motor sensory deficit - Strength is 5/5 in upper and lower extremities equal 

bilaterally, reflexes 2/4 upper and lower extremities equal bilaterally


Psychiatric exam: PRESENT: appropriate affect, normal mood


Skin exam: PRESENT: dry, intact, warm





Results


Laboratory Results: 


                                        











WBC  6.7 10^3/uL (4.0-10.5)   09/01/20  05:18    


 


RBC  4.60 10^6/uL (4.35-5.55)   09/01/20  05:18    


 


Hgb  13.9 g/dL (13.5-17.0)   09/01/20  05:18    


 


Hct  40.5 % (37.9-51.0)   09/01/20  05:18    


 


MCV  88 fl (80-97)   09/01/20  05:18    


 


MCH  30.2 pg (27.0-33.4)   09/01/20  05:18    


 


MCHC  34.3 g/dL (32.0-36.0)   09/01/20  05:18    


 


RDW  14.4 % (11.5-14.0)  H  09/01/20  05:18    


 


Plt Count  257 10^3/uL (150-450)   09/01/20  05:18    


 


Lymph % (Auto)  8.1 % (13-45)  L  09/01/20  05:18    


 


Mono % (Auto)  1.3 % (3-13)  L  09/01/20  05:18    


 


Eos % (Auto)  0.1 % (0-6)   09/01/20  05:18    


 


Baso % (Auto)  0.1 % (0-2)   09/01/20  05:18    


 


Absolute Neuts (auto)  6.1 10^3/uL (1.7-8.2)   09/01/20  05:18    


 


Absolute Lymphs (auto)  0.5 10^3/uL (0.5-4.7)   09/01/20  05:18    


 


Absolute Monos (auto)  0.1 10^3/uL (0.1-1.4)   09/01/20  05:18    


 


Absolute Eos (auto)  0.0 10^3/uL (0.0-0.6)   09/01/20  05:18    


 


Absolute Basos (auto)  0.0 10^3/uL (0.0-0.2)   09/01/20  05:18    


 


Seg Neutrophils %  90.4 % (42-78)  H  09/01/20  05:18    


 


ESR  13 mm/hr (0-20)   09/01/20  05:18    


 


Sodium  137.1 mmol/L (137-145)   09/01/20  05:18    


 


Potassium  4.5 mmol/L (3.6-5.0)   09/01/20  05:18    


 


Chloride  102 mmol/L ()   09/01/20  05:18    


 


Carbon Dioxide  26 mmol/L (22-30)   09/01/20  05:18    


 


Anion Gap  9  (5-19)   09/01/20  05:18    


 


BUN  16 mg/dL (7-20)   09/01/20  05:18    


 


Creatinine  0.78 mg/dL (0.52-1.25)   09/01/20  05:18    


 


Est GFR ( Amer)  > 60  (>60)   09/01/20  05:18    


 


Est GFR (MDRD) Non-Af  > 60  (>60)   09/01/20  05:18    


 


Glucose  171 mg/dL ()  H  09/01/20  05:18    


 


POC Glucose  176 mg/dL ()  H  09/02/20  16:50    


 


Hemoglobin A1c %  7.2 % (4.7-6.0)  H  09/01/20  05:18    


 


Calcium  9.7 mg/dL (8.4-10.2)   09/01/20  05:18    


 


Phosphorus  3.5 mg/dL (2.5-4.5)   09/01/20  05:18    


 


Magnesium  2.1 mg/dL (1.6-2.3)   09/01/20  05:18    


 


Total Bilirubin  0.8 mg/dL (0.2-1.3)   08/31/20  09:32    


 


Direct Bilirubin  0.3 mg/dL (0.0-0.4)   08/31/20  09:32    


 


Neonat Total Bilirubin  Not Reportable   08/31/20  09:32    


 


Neonat Direct Bilirubin  Not Reportable   08/31/20  09:32    


 


Neonat Indirect Bili  Not Reportable   08/31/20  09:32    


 


AST  46 U/L (17-59)   08/31/20  09:32    


 


ALT  54 U/L (<50)  H  08/31/20  09:32    


 


Alkaline Phosphatase  140 U/L ()  H  08/31/20  09:32    


 


Creatine Kinase  196 U/L ()  H  08/31/20  09:32    


 


CK-MB (CK-2)  2.24 ng/mL (<4.55)   08/31/20  09:32    


 


Troponin I  < 0.012 ng/mL  08/31/20  09:32    


 


C-Reactive Protein  < 5.0 mg/L (<10.0)   09/01/20  05:18    


 


Total Protein  6.9 g/dL (6.3-8.2)   08/31/20  09:32    


 


Albumin  3.9 g/dL (3.5-5.0)   08/31/20  09:32    


 


TSH  0.32 uIU/mL (0.47-4.68)  L  09/01/20  05:18    


 


Urine Color  YELLOW   08/31/20  10:38    


 


Urine Appearance  CLEAR   08/31/20  10:38    


 


Urine pH  6.0  (5.0-9.0)   08/31/20  10:38    


 


Ur Specific Gravity  1.020   08/31/20  10:38    


 


Urine Protein  30 mg/dL (NEGATIVE)  H  08/31/20  10:38    


 


Urine Glucose (UA)  NEGATIVE mg/dL (NEGATIVE)   08/31/20  10:38    


 


Urine Ketones  NEGATIVE mg/dL (NEGATIVE)   08/31/20  10:38    


 


Urine Blood  NEGATIVE  (NEGATIVE)   08/31/20  10:38    


 


Urine Nitrite  NEGATIVE  (NEGATIVE)   08/31/20  10:38    


 


Urine Bilirubin  NEGATIVE  (NEGATIVE)   08/31/20  10:38    


 


Urine Urobilinogen  2.0 mg/dL (<2.0)  H  08/31/20  10:38    


 


Ur Leukocyte Esterase  NEGATIVE  (NEGATIVE)   08/31/20  10:38    


 


Urine WBC (Auto)  1 /HPF  08/31/20  10:38    


 


Urine RBC (Auto)  0 /HPF  08/31/20  10:38    


 


Squamous Epi Cells Auto  <1 /HPF  08/31/20  10:38    


 


Urine Mucus (Auto)  OCC /LPF  08/31/20  10:38    


 


Urine Ascorbic Acid  40  (NEGATIVE)  H  08/31/20  10:38    








                                        











  08/31/20





  09:32


 


CK-MB (CK-2)  2.24


 


Troponin I  < 0.012











Impressions: 


                                        





Chest CT  08/31/20 00:00


IMPRESSION: 


There is a subpleural airspace opacity at the right lower lobe.


This could reflect atelectasis. Interval follow-up is recommended


to exclude a developing nodule.


 


No filling defect is seen to suggest a pulmonary artery embolism.


 


Moderate paraseptal and centrilobular emphysematous changes are


seen.


 


 








Head CT  08/31/20 11:13


IMPRESSION:  CHRONIC CHANGES OF ATROPHY AND MICROVASCULAR ISCHEMIA.  NO ACUTE 

PROCESS.


MILD MUCOSAL THICKENING WITHIN THE ETHMOID AIR CELLS AND MAXILLARY SINUSES.


EVIDENCE OF ACUTE STROKE: NO.


 








Head MRI  08/31/20 13:31


IMPRESSION:


1. No acute ischemia, intracranial mass or mass effect, or evidence of 

intracranial hemorrhage.


2. Mild chronic sinusitis.


EVIDENCE OF ACUTE STROKE: NO.


 








Carotid Doppler Study  08/31/20 18:47


IMPRESSION:  NO HEMODYNAMICALLY SIGNIFICANT STENOSIS.


SMALL MASS LOCATED BETWEEN THE RIGHT INTERNAL AND EXTERNAL CAROTID ARTERIES.  

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  RECOMMEND FOLLOW-UP WITH MRI OF THE 

NECK.


 








Orbits/Face/Neck MRI  09/01/20 00:00


IMPRESSION:


1. Retropharyngeal course bilateral common carotid arteries.  Deep bifurcation 

of the right common carotid artery with internal carotid artery straightening 

over the anterior scalene muscle.  There is no carotid bulb mass.  No abnormal 

enhancement.  The finding on ultrasound is thought represent the anterior 

scalene muscle.  No cervical mass or adenopathy is detected.


 














Plan


Plan of Treatment: 


Follow-up with PCP


Follow-up with ENT


Prednisone taper


Sliding scale insulin while on prednisone


Time Spent: Greater than 30 Minutes





Stroke


Is this a Stroke Patient?: No





Acute Heart Failure





- **


Is this a Heart Failure Patient?: No

## 2020-09-02 NOTE — RADIOLOGY REPORT (SQ)
EXAM DESCRIPTION:  MRI ORBIT/FACIAL/NECK COMBO



IMAGES COMPLETED DATE/TIME:  9/1/2020 7:08 pm



CONTRAST TYPE AND DOSE:  20 mL ProHance



RENAL FUNCTION:  Not indicated.  ACR Type II contrast agent associated with few, if any, unconfounded
 cases of NSF



LIMITATIONS:  None.



FINDINGS:  SKULL BASE: Intact.

MAJOR SALIVARY GLANDS: No solid or cystic masses.  No inflammatory changes.

LYMPHADENOPATHY: No adenopathy.

MUCOSAL MASSES OR ASYMMETRY: No mucosal masses or asymmetry.

LARYNX/CORDS: No abnormal findings.

VASCULAR STRUCTURES: The major vessels are patent.  Common carotid arteries have a retropharyngeal co
urse bilaterally.  The deep right bifurcation traverses the right anterior scalene muscle with a mild
ly tortuous course.  There is no enhancing carotid bulb masses.  The

LUNG APICES: Not well visualized, no effusion.

BONES: Multilevel degenerative disc disease and spondylosis. Small disc bulge at C3-C4 with flattenin
g of the ventral thecal sac.  Mild acquired spinal canal stenosis secondary to bulging disc material 
with spinal canal measuring about 8 mm at this level.  No abnormal cord signal.

THYROID: Normal size.  No masses.

PARANASAL SINUSES: Mild mucosal thickening inferior maxillary sinuses bilaterally.  Small amount of r
etained secretions in the left inferior mastoid air cells.

OTHER: No other significant finding.



IMPRESSION:

1. Retropharyngeal course bilateral common carotid arteries.  Deep bifurcation of the right common ca
rotid artery with internal carotid artery straightening over the anterior scalene muscle.  There is n
o carotid bulb mass.  No abnormal enhancement.  The finding on ultrasound is thought represent the an
terior scalene muscle.  No cervical mass or adenopathy is detected.



TECHNICAL DOCUMENTATION:  JOB ID:  [id number]

 2011 [employer]- All Rights Reserved



REASON FOR STUDY:  Neck Mass seen on carotid PVL R22.1  LOCALIZED SWELLING, MASS AND LUMP, NECK

Neck Mass seen on carotid PVL R22.1  LOCALIZED SWELLING, MASS AND LUMP, NECK.  Syncope, vertigo, left
 eye vision loss.



COMPARISON:  None.

Carotid ultrasound, 8/31/2020.  MRA neck, 9/13/2014.



TECHNIQUE:  Multiplanar multisequence imaging of the soft tissues of the neck performed without and w
ith contrast.  All images stored on PACS.



Reading location - IP/workstation name: [id number]

## 2023-10-21 NOTE — PDOC H&P
History of Present Illness


Admission Date/PCP: 


  08/31/20 17:45





  DARLING WHEELER III, MD





History of Present Illness: 


MARIO LIMON is a 85 year old male with past medical history significant 

for MI/CAD follows with Dr. Baca cardiology, COPD stable follows with Dr. Ruffin, history of TIA, T2DM diet-controlled, former smoker presents with a 1 

day history of progressive nausea/vomiting/dizziness/ataxia all of which is 

worse with movement and ambulation.  Denies any fever/chills/diarrhea/abdominal 

pain/chest pain.  Patient does have a identical twin that had temporal arteritis

requiring steroids.  Patient denies any vision loss or acute vision changes.  

Denies any headaches.  Wife does note that the patient has been getting 

progressively weaker in general and is having less energy.  She also notes a 

very gradual mild cognitive decline over time.  Patient takes donepezil for this

and he said he has started this approximately 2 months ago.  Patient has had a 

problem with BPPV in the past and this resolved spontaneously.  This was many 

years ago.  Patient does not follow with ENT.  MRI with and without contrast 

brain did not show any acute findings the ventricles did appear somewhat 

prominent to me.  CT head was unremarkable as well.  Labs and vitals reviewed 

with no acute findings.  On exam, patient is unable to ambulate becomes 

extremely dizzy and is high risk to fall.  Wife states she can not care for the 

patient at home is unable to lift him if he does fall.  Patient be admitted to 

observation for treatment of BPPV with prednisone, fluticasone for sinusitis 

seen on MRI, PT/OT evaluation for home health.  Of note, patient has an 

extensive smoking history of approximately 60 pack years but quit many years 

ago.  Given his progressive generalized weakness and the fact he has never been 

evaluated for lung cancer, we will get a noncontrast CT.








Past Medical History


Cardiac Medical History: Reports: Hyperlipidema


Pulmonary Medical History: Reports: Chronic Obstructive Pulmonary Disease (COPD)


Endocrine Medical History: Reports: Diabetes Mellitus Type 2


GI Medical History: Reports: Gastroesophageal Reflux Disease


Psychiatric Medical History: Reports: Depression


Traumatic Medical History: Reports: None


Hematology: Reports: None


Infectious Medical History: Reports: None





Past Surgical History


Past Surgical History: Reports: Appendectomy, Orthopedic Surgery - shoulder, 

Tonsillectomy





Social History


Information Source: Patient, Relative, Emergency Med Personnel


Lives with: Family


Smoking Status: Former Smoker


Electronic Cigarette use?: No


Frequency of Alcohol Use: Occasional


Hx Recreational Drug Use: No


Drugs: None


Hx Prescription Drug Abuse: No





- Advance Directive


Resuscitation Status: Full Code


Surrogate healthcare decision maker:: 





Wife





Family History


Family History: Reviewed & Not Pertinent


Parental Family History Reviewed: Yes


Children Family History Reviewed: Yes


Sibling(s) Family History Reviewed.: Yes





Medication/Allergy


Home Medications: 








Aspirin [Aspirin 81 mg Chewable Tablet] 81 mg PO DAILY 09/12/14 


Atorvastatin Calcium 20 mg PO DAILY 09/12/14 


Bupropion HCl [Wellbutrin Xl 150 mg 24hr Tablet] 150 mg PO DAILY 09/12/14 


Cefuroxime Axetil [Ceftin 250 mg Tablet] 250 mg PO Q12H 09/12/14 


Cetirizine HCl [Zyrtec 10 mg Chewable Tab] 10 mg PO DAILY PRN 09/12/14 


Citalopram Hydrobromide [Celexa 20 mg Tablet] 20 mg PO DAILY 09/12/14 


Fexofenadine HCl [Allegra Allergy] 180 mg PO DAILY PRN 09/12/14 


Fiber [Fiber Off] 2 - 3 tab PO DAILY 09/12/14 


Honey [Medihoney] 15 ml PO DAILY 09/12/14 


Levothyroxine Sodium [Synthroid] 150 mcg PO DAILY 09/12/14 


Mometasone/Formoterol [Dulera 100 Mcg-5 Mcg Inhaler] 2 puff IH BID 09/12/14 


Olive Oil [Olive Oil 120 ml Bottle] 15 ml PO DAILY 09/12/14 


Pantoprazole Sodium [Protonix] 40 mg PO DAILY 09/12/14 








Allergies/Adverse Reactions: 


                                        





Penicillins Adverse Reaction (Verified 08/31/20 19:13)


   











Review of Systems


All systems: reviewed and no additional remarkable complaints except as stated -

 Review of systems per HPI, otherwise negative





Physical Exam


Vital Signs: 


                                        











Temp Pulse Resp BP Pulse Ox


 


 98.1 F   71   15   127/72 H  95 


 


 08/31/20 09:08  08/31/20 09:08  08/31/20 14:00  08/31/20 11:01  08/31/20 14:00








                                 Intake & Output











 08/30/20 08/31/20 09/01/20





 06:59 06:59 06:59


 


Weight   97.522 kg











General appearance: PRESENT: no acute distress, obese, well-developed, well-

nourished


Head exam: PRESENT: atraumatic, normocephalic


Eye exam: PRESENT: conjunctiva pink, EOMI, nystagmus.  ABSENT: scleral icterus


Mouth exam: PRESENT: moist


Respiratory exam: PRESENT: clear to auscultation aki.  ABSENT: rales, rhonchi, 

wheezes


Cardiovascular exam: PRESENT: RRR, systolic murmur - Systolic ejection murmur 

consistent with aortic stenosis.  ABSENT: diastolic murmur, rubs


GI/Abdominal exam: PRESENT: normal bowel sounds, soft.  ABSENT: distended, 

guarding, mass, organolmegaly, rebound, tenderness


Extremities exam: ABSENT: pedal edema


Neurological exam: PRESENT: alert, awake, oriented to person, oriented to place,

 oriented to time, oriented to situation, CN II-XII grossly intact.  ABSENT: 

motor sensory deficit - Strength is 5/5 in upper and lower extremities equal 

bilaterally, reflexes 2/4 upper and lower extremities equal bilaterally


Psychiatric exam: PRESENT: appropriate affect, normal mood


Skin exam: PRESENT: dry, intact, warm





Results


Laboratory Results: 


                                        





                                 08/31/20 09:32 





                                 08/31/20 09:32 





                                        











  08/31/20 08/31/20 08/31/20





  09:32 09:32 10:38


 


WBC  6.2  


 


RBC  4.41  


 


Hgb  13.0 L  


 


Hct  38.7  


 


MCV  88  


 


MCH  29.5  


 


MCHC  33.7  


 


RDW  14.5 H  


 


Plt Count  245  


 


Seg Neutrophils %  76.7  


 


Sodium   138.0 


 


Potassium   4.2 


 


Chloride   101 


 


Carbon Dioxide   30 


 


Anion Gap   7 


 


BUN   15 


 


Creatinine   0.78 


 


Est GFR ( Amer)   > 60 


 


Glucose   146 H 


 


Calcium   9.2 


 


Total Bilirubin   0.8 


 


AST   46 


 


Alkaline Phosphatase   140 H 


 


Total Protein   6.9 


 


Albumin   3.9 


 


Urine Color    YELLOW


 


Urine Appearance    CLEAR


 


Urine pH    6.0


 


Ur Specific Gravity    1.020


 


Urine Protein    30 H


 


Urine Glucose (UA)    NEGATIVE


 


Urine Ketones    NEGATIVE


 


Urine Blood    NEGATIVE


 


Urine Nitrite    NEGATIVE


 


Ur Leukocyte Esterase    NEGATIVE


 


Urine WBC (Auto)    1


 


Urine RBC (Auto)    0








                                        











  08/31/20 08/31/20





  09:32 09:32


 


Creatine Kinase  196 H 


 


CK-MB (CK-2)   2.24


 


Troponin I   < 0.012











Impressions: 


                                        





Head CT  08/31/20 11:13


IMPRESSION:  CHRONIC CHANGES OF ATROPHY AND MICROVASCULAR ISCHEMIA.  NO ACUTE 

PROCESS.


MILD MUCOSAL THICKENING WITHIN THE ETHMOID AIR CELLS AND MAXILLARY SINUSES.


EVIDENCE OF ACUTE STROKE: NO.


 








Head MRI  08/31/20 13:31


IMPRESSION:


1. No acute ischemia, intracranial mass or mass effect, or evidence of 

intracranial hemorrhage.


2. Mild chronic sinusitis.


EVIDENCE OF ACUTE STROKE: NO.


 














Assessment and Plan





- Diagnosis


(1) Peripheral vertigo


Qualifiers: 


   Laterality: unspecified laterality   Qualified Code(s): H81.399 - Other 

peripheral vertigo, unspecified ear   


Is this a current diagnosis for this admission?: Yes   


Plan: 





Likely caused by sinusitis and otoliths causing BPPV


Needs ENT follow-up outpatient


Prednisone taper


Meclizine as needed


PT/OT evaluation


Carotid PVL


MRI with and without contrast brain no acute findings, possibly prominent 

ventricles per my read however patient denies any loss of bowel or bladder 

control and is not acutely confused








(2) Sinusitis


Qualifiers: 


   Sinusitis location: maxillary   Chronicity: unspecified   Qualified Code(s): 

J32.0 - Chronic maxillary sinusitis   


Is this a current diagnosis for this admission?: Yes   


Plan: 





Seen on MRI brain


Fluticasone nasal spray


Oral steroids as above


Continue OTC allergy med








(3) COPD (chronic obstructive pulmonary disease)


Is this a current diagnosis for this admission?: Yes   


Plan: 





No exacerbation


Continue inhalers


As needed nebs








(4) Former smoker


Is this a current diagnosis for this admission?: Yes   


Plan: 





Generalized weakness progressive, get chest CT to look for lung masses given 

extensive smoking history of 60 pack years








(5) Hyperlipidemia


Is this a current diagnosis for this admission?: Yes   


Plan: 





Continue statin, stable








(6) Hypothyroidism


Is this a current diagnosis for this admission?: Yes   


Plan: 





Continue Synthroid


Check TSH








- Time


Time Spent with patient: 35 or more minutes


Medications reviewed and adjusted accordingly: Yes


Anticipated Discharge Disposition: Home with Home Health


Anticipated Discharge Timeframe: within 24 hours Patient requests all Lab, Cardiology, and Radiology Results on their Discharge Instructions